# Patient Record
Sex: MALE | Race: WHITE | NOT HISPANIC OR LATINO | URBAN - METROPOLITAN AREA
[De-identification: names, ages, dates, MRNs, and addresses within clinical notes are randomized per-mention and may not be internally consistent; named-entity substitution may affect disease eponyms.]

---

## 2017-11-14 ENCOUNTER — EMERGENCY (EMERGENCY)
Facility: HOSPITAL | Age: 32
LOS: 1 days | Discharge: HOME | End: 2017-11-14
Attending: EMERGENCY MEDICINE | Admitting: EMERGENCY MEDICINE
Payer: COMMERCIAL

## 2017-11-14 VITALS
OXYGEN SATURATION: 99 % | SYSTOLIC BLOOD PRESSURE: 133 MMHG | WEIGHT: 199.96 LBS | HEART RATE: 54 BPM | RESPIRATION RATE: 17 BRPM | TEMPERATURE: 98 F | DIASTOLIC BLOOD PRESSURE: 82 MMHG

## 2017-11-14 DIAGNOSIS — R50.9 FEVER, UNSPECIFIED: ICD-10-CM

## 2017-11-14 DIAGNOSIS — R61 GENERALIZED HYPERHIDROSIS: ICD-10-CM

## 2017-11-14 LAB
ALBUMIN SERPL ELPH-MCNC: 3.9 G/DL — SIGNIFICANT CHANGE UP (ref 3.4–5)
ALP SERPL-CCNC: 60 U/L — SIGNIFICANT CHANGE UP (ref 40–120)
ALT FLD-CCNC: 36 U/L — SIGNIFICANT CHANGE UP (ref 12–42)
ANION GAP SERPL CALC-SCNC: 5 MMOL/L — LOW (ref 9–16)
APPEARANCE UR: CLEAR — SIGNIFICANT CHANGE UP
AST SERPL-CCNC: 24 U/L — SIGNIFICANT CHANGE UP (ref 15–37)
BASOPHILS NFR BLD AUTO: 0.4 % — SIGNIFICANT CHANGE UP (ref 0–2)
BILIRUB SERPL-MCNC: 0.6 MG/DL — SIGNIFICANT CHANGE UP (ref 0.2–1.2)
BILIRUB UR-MCNC: NEGATIVE — SIGNIFICANT CHANGE UP
BUN SERPL-MCNC: 19 MG/DL — SIGNIFICANT CHANGE UP (ref 7–23)
CALCIUM SERPL-MCNC: 8.8 MG/DL — SIGNIFICANT CHANGE UP (ref 8.5–10.5)
CHLORIDE SERPL-SCNC: 102 MMOL/L — SIGNIFICANT CHANGE UP (ref 96–108)
CO2 SERPL-SCNC: 29 MMOL/L — SIGNIFICANT CHANGE UP (ref 22–31)
COLOR SPEC: YELLOW — SIGNIFICANT CHANGE UP
CREAT SERPL-MCNC: 1.09 MG/DL — SIGNIFICANT CHANGE UP (ref 0.5–1.3)
DIFF PNL FLD: NEGATIVE — SIGNIFICANT CHANGE UP
EOSINOPHIL NFR BLD AUTO: 0.8 % — SIGNIFICANT CHANGE UP (ref 0–6)
GLUCOSE SERPL-MCNC: 104 MG/DL — HIGH (ref 70–99)
GLUCOSE UR QL: NEGATIVE — SIGNIFICANT CHANGE UP
HCT VFR BLD CALC: 42.8 % — SIGNIFICANT CHANGE UP (ref 39–50)
HGB BLD-MCNC: 15 G/DL — SIGNIFICANT CHANGE UP (ref 13–17)
IMM GRANULOCYTES NFR BLD AUTO: 0.2 % — SIGNIFICANT CHANGE UP (ref 0–1.5)
KETONES UR-MCNC: NEGATIVE — SIGNIFICANT CHANGE UP
LEUKOCYTE ESTERASE UR-ACNC: NEGATIVE — SIGNIFICANT CHANGE UP
LYMPHOCYTES # BLD AUTO: 29.6 % — SIGNIFICANT CHANGE UP (ref 13–44)
MCHC RBC-ENTMCNC: 31.3 PG — SIGNIFICANT CHANGE UP (ref 27–34)
MCHC RBC-ENTMCNC: 35 G/DL — SIGNIFICANT CHANGE UP (ref 32–36)
MCV RBC AUTO: 89.4 FL — SIGNIFICANT CHANGE UP (ref 80–100)
MONOCYTES NFR BLD AUTO: 9.9 % — SIGNIFICANT CHANGE UP (ref 2–14)
NEUTROPHILS NFR BLD AUTO: 59.1 % — SIGNIFICANT CHANGE UP (ref 43–77)
NITRITE UR-MCNC: NEGATIVE — SIGNIFICANT CHANGE UP
PH UR: 5.5 — SIGNIFICANT CHANGE UP (ref 5–8)
PLATELET # BLD AUTO: 178 K/UL — SIGNIFICANT CHANGE UP (ref 150–400)
POTASSIUM SERPL-MCNC: 4.3 MMOL/L — SIGNIFICANT CHANGE UP (ref 3.5–5.3)
POTASSIUM SERPL-SCNC: 4.3 MMOL/L — SIGNIFICANT CHANGE UP (ref 3.5–5.3)
PROT SERPL-MCNC: 6.9 G/DL — SIGNIFICANT CHANGE UP (ref 6.4–8.2)
PROT UR-MCNC: NEGATIVE MG/DL — SIGNIFICANT CHANGE UP
RBC # BLD: 4.79 M/UL — SIGNIFICANT CHANGE UP (ref 4.2–5.8)
RBC # FLD: 11.4 % — SIGNIFICANT CHANGE UP (ref 10.3–16.9)
SODIUM SERPL-SCNC: 136 MMOL/L — SIGNIFICANT CHANGE UP (ref 132–145)
SP GR SPEC: 1.01 — SIGNIFICANT CHANGE UP (ref 1–1.03)
UROBILINOGEN FLD QL: 0.2 E.U./DL — SIGNIFICANT CHANGE UP
WBC # BLD: 5.1 K/UL — SIGNIFICANT CHANGE UP (ref 3.8–10.5)
WBC # FLD AUTO: 5.1 K/UL — SIGNIFICANT CHANGE UP (ref 3.8–10.5)

## 2017-11-14 PROCEDURE — 99284 EMERGENCY DEPT VISIT MOD MDM: CPT | Mod: 25

## 2017-11-14 RX ORDER — SODIUM CHLORIDE 9 MG/ML
1000 INJECTION INTRAMUSCULAR; INTRAVENOUS; SUBCUTANEOUS ONCE
Qty: 0 | Refills: 0 | Status: COMPLETED | OUTPATIENT
Start: 2017-11-14 | End: 2017-11-14

## 2017-11-14 RX ADMIN — SODIUM CHLORIDE 1000 MILLILITER(S): 9 INJECTION INTRAMUSCULAR; INTRAVENOUS; SUBCUTANEOUS at 08:53

## 2017-11-14 NOTE — ED PROVIDER NOTE - MEDICAL DECISION MAKING DETAILS
one week of intermittent fever, night sweats.  No cough, diarrhea, vomiting.  Well appearing.  NO VS derangements.  Neck supple, no meningismus.  Lungs clear, abdomen soft/non tender.  Basic labs, urine testing, fluids ordered.  Do not suspect Mono one week of intermittent fever, night sweats.  No cough, diarrhea, vomiting.  Well appearing.  NO VS derangements.  Neck supple, no meningismus.  Lungs clear, abdomen soft/non tender.  Basic labs, urine testing, fluids ordered.  Do not suspect Mono or flu

## 2017-11-14 NOTE — ED PROVIDER NOTE - PROGRESS NOTE DETAILS
labs, urine unremarkable.  Symptoms improved with IVF.   No further testing at this time Conservative management discussed with the patient in detail.  Close PMD follow up encouraged.  Strict ED return instructions discussed in detail and patient given the opportunity to ask any questions about their discharge diagnosis and instructions

## 2017-11-14 NOTE — ED PROVIDER NOTE - OBJECTIVE STATEMENT
7 days of intermittent fever, sweating, mild body aches.  Gradual, mild.  Denies cough, nausea, vomiting, diarrhea or abdominal pain.  Also denies URI symptoms.  No sick contacts, travel history.  Lives alone.  [place] [facility] [place] despite negative flu testing.

## 2025-05-29 NOTE — ED ADULT NURSE NOTE - CHIEF COMPLAINT QUOTE
Problem: Safety - Adult  Goal: Free from fall injury  5/29/2025 0853 by Destiny Sears RN  Outcome: Progressing     Problem: Pain  Goal: Verbalizes/displays adequate comfort level or baseline comfort level  5/29/2025 0853 by Destiny Sears RN  Outcome: Progressing     Problem: Skin/Tissue Integrity  Goal: Skin integrity remains intact  Description: 1.  Monitor for areas of redness and/or skin breakdown2.  Assess vascular access sites hourly3.  Every 4-6 hours minimum:  Change oxygen saturation probe site4.  Every 4-6 hours:  If on nasal continuous positive airway pressure, respiratory therapy assess nares and determine need for appliance change or resting period  5/29/2025 0853 by Destiny Sears RN  Outcome: Progressing      pt came in ambulatory with complaint of flu like symptoms x 1 week. Expiration Date (Optional): 03 2022